# Patient Record
Sex: FEMALE | Race: WHITE | NOT HISPANIC OR LATINO | Employment: OTHER | URBAN - METROPOLITAN AREA
[De-identification: names, ages, dates, MRNs, and addresses within clinical notes are randomized per-mention and may not be internally consistent; named-entity substitution may affect disease eponyms.]

---

## 2017-01-29 DIAGNOSIS — J44.9 CHRONIC OBSTRUCTIVE PULMONARY DISEASE, UNSPECIFIED COPD TYPE (HCC): ICD-10-CM

## 2017-01-30 RX ORDER — ALBUTEROL SULFATE 90 UG/1
POWDER, METERED RESPIRATORY (INHALATION)
Qty: 1 EACH | Refills: 5 | Status: SHIPPED | OUTPATIENT
Start: 2017-01-30

## 2017-01-30 NOTE — TELEPHONE ENCOUNTER
Have we ever prescribed this med? Yes.  If yes, what date?     Last OV: 09/28/2016 - Denise Alfaro     Next OV: 02/14/2017 - Denise Alfaro     DX: COPD    Medications: Proair

## 2017-02-14 ENCOUNTER — OFFICE VISIT (OUTPATIENT)
Dept: PULMONOLOGY | Facility: HOSPICE | Age: 72
End: 2017-02-14
Payer: MEDICARE

## 2017-02-14 VITALS
DIASTOLIC BLOOD PRESSURE: 70 MMHG | WEIGHT: 223 LBS | HEIGHT: 62 IN | RESPIRATION RATE: 16 BRPM | TEMPERATURE: 98.6 F | OXYGEN SATURATION: 92 % | BODY MASS INDEX: 41.04 KG/M2 | HEART RATE: 91 BPM | SYSTOLIC BLOOD PRESSURE: 140 MMHG

## 2017-02-14 DIAGNOSIS — R91.8 RADIOLOGIC FINDINGS OF LUNG FIELD, ABNORMAL: ICD-10-CM

## 2017-02-14 DIAGNOSIS — G47.33 OSA ON CPAP: ICD-10-CM

## 2017-02-14 DIAGNOSIS — J43.9 PULMONARY EMPHYSEMA, UNSPECIFIED EMPHYSEMA TYPE (HCC): ICD-10-CM

## 2017-02-14 PROCEDURE — 3017F COLORECTAL CA SCREEN DOC REV: CPT | Mod: 8P | Performed by: NURSE PRACTITIONER

## 2017-02-14 PROCEDURE — G8484 FLU IMMUNIZE NO ADMIN: HCPCS | Performed by: NURSE PRACTITIONER

## 2017-02-14 PROCEDURE — 1036F TOBACCO NON-USER: CPT | Performed by: NURSE PRACTITIONER

## 2017-02-14 PROCEDURE — 99213 OFFICE O/P EST LOW 20 MIN: CPT | Performed by: NURSE PRACTITIONER

## 2017-02-14 PROCEDURE — 1101F PT FALLS ASSESS-DOCD LE1/YR: CPT | Mod: 8P | Performed by: NURSE PRACTITIONER

## 2017-02-14 PROCEDURE — 3014F SCREEN MAMMO DOC REV: CPT | Mod: 8P | Performed by: NURSE PRACTITIONER

## 2017-02-14 PROCEDURE — 4040F PNEUMOC VAC/ADMIN/RCVD: CPT | Mod: 8P | Performed by: NURSE PRACTITIONER

## 2017-02-14 PROCEDURE — G8432 DEP SCR NOT DOC, RNG: HCPCS | Performed by: NURSE PRACTITIONER

## 2017-02-14 PROCEDURE — G8419 CALC BMI OUT NRM PARAM NOF/U: HCPCS | Performed by: NURSE PRACTITIONER

## 2017-02-14 RX ORDER — ALBUTEROL SULFATE 90 UG/1
2 AEROSOL, METERED RESPIRATORY (INHALATION) EVERY 4 HOURS PRN
Qty: 1 INHALER | Refills: 5 | Status: SHIPPED | OUTPATIENT
Start: 2017-02-14

## 2017-02-14 NOTE — MR AVS SNAPSHOT
"        Chacha Aviles   2017 4:00 PM   Office Visit   MRN: [id number]    Department:  [facility]   Dept Phone:  [phone]    Description:  Female : 1945   Provider:  TORIBIO Rodriguez           Reason for Visit     COPD 4 Mth Follow Up      Allergies as of 2017     Allergen Noted Reactions    Latex 2009         You were diagnosed with     Pulmonary emphysema, unspecified emphysema type (CMS-HCC)   [2149182]       JESSIKA on CPAP   [648932]       Radiologic findings of lung field, abnormal   [861921]         Vital Signs     Blood Pressure Pulse Temperature Respirations Height Weight    140/70 mmHg 91 37 °C (98.6 °F) 16 1.575 m (5' 2.01\") 101.152 kg (223 lb)    Body Mass Index Oxygen Saturation Smoking Status             40.78 kg/m2 92% Former Smoker         Basic Information     Date Of Birth Sex Race Ethnicity Preferred Language    1945 Female White Non- English      Problem List              ICD-10-CM Priority Class Noted - Resolved    COPD (chronic obstructive pulmonary disease) (CMS-HCC) J44.9   2016 - Present    Radiologic findings of lung field, abnormal R91.8   2016 - Present    JESSIKA on CPAP G47.33   2016 - Present    NSTEMI (non-ST elevated myocardial infarction) (CMS-HCC) I21.4   2016 - Present    Ex-smoker Z87.891   10/31/2016 - Present    Dyslipidemia E78.5   10/31/2016 - Present      Health Maintenance        Date Due Completion Dates    IMM DTaP/Tdap/Td Vaccine (1 - Tdap) 1963 ---    PAP SMEAR 1965 ---    MAMMOGRAM 1984 ---    COLONOSCOPY 1994 ---    IMM ZOSTER VACCINE 2004 ---    BONE DENSITY 2009 ---    IMM PNEUMOCOCCAL 65+ (ADULT) LOW/MEDIUM RISK SERIES (1 of 2 - PCV13) 2009 ---    IMM INFLUENZA (1) 2016 ---            Current Immunizations     No immunizations on file.      Below and/or attached are the medications your provider expects you to take. Review all of your home medications and newly ordered " medications with your provider and/or pharmacist. Follow medication instructions as directed by your provider and/or pharmacist. Please keep your medication list with you and share with your provider. Update the information when medications are discontinued, doses are changed, or new medications (including over-the-counter products) are added; and carry medication information at all times in the event of emergency situations     Allergies:  LATEX - (reactions not documented)               Medications  Valid as of: February 14, 2017 -  4:28 PM    Generic Name Brand Name Tablet Size Instructions for use    Albuterol Sulfate (Aero Soln) albuterol 108 (90 BASE) MCG/ACT Inhale 2 Puffs by mouth every 6 hours as needed for Shortness of Breath.        Albuterol Sulfate (Nebu Soln) PROVENTIL 2.5mg/3ml 2.5 mg by Nebulization route every four hours as needed for Shortness of Breath.        Albuterol Sulfate (AEROSOL POWDER, BREATH ACTIVATED) PROAIR RESPICLICK 108 (90 BASE) MCG/ACT INHALE 2 PUFFS EVERY 4 TO 6 HOURS AS NEEDED        Albuterol Sulfate (Aero Soln) albuterol 108 (90 BASE) MCG/ACT Inhale 2 Puffs by mouth every four hours as needed for Shortness of Breath (Wheezing).        Aspirin (Tab) aspirin 81 MG Take 81 mg by mouth every day.        Atorvastatin Calcium (Tab) LIPITOR 40 MG Take 40 mg by mouth every evening.        Budesonide-Formoterol Fumarate (Aerosol) SYMBICORT 160-4.5 MCG/ACT Inhale 2 Puffs by mouth 2 Times a Day.        Carvedilol (Tab) COREG 3.125 MG Take 3.125 mg by mouth 2 times a day, with meals.        Citalopram Hydrobromide (Tab) CELEXA 40 MG Take 40 mg by mouth every day.        Doxycycline Hyclate (Cap) VIBRAMYCIN 100 MG Take 1 Cap by mouth 2 times a day. Take until gone.        HydroCHLOROthiazide (Cap) MICROZIDE 12.5 MG Take 12.5 mg by mouth every day.        Hydrocodone-Acetaminophen   Take  by mouth.        Hydrocodone-Acetaminophen (Tab) VICODIN 5-500 MG Take 1-2 Tabs by mouth every four  hours as needed.        Ibuprofen (Tab) MOTRIN 800 MG Take 1 Tab by mouth every 8 hours as needed for Mild Pain.        Lisinopril (Tab) PRINIVIL 2.5 MG Take 2.5 mg by mouth every day.        Loratadine (Tab) CLARITIN 10 MG Take 10 mg by mouth every day.        Mometasone Furo-Formoterol Fum (Aerosol) Mometasone Furo-Formoterol Fum 200-5 MCG/ACT Inhale 2 Puffs by mouth 2 Times a Day. Use spacer. Rinse mouth after use.        Naproxen   Take  by mouth.        PredniSONE (Tab) DELTASONE 10 MG Take 30mg x 5 days, then take 20mg x 5 days, then take 10mg x 5 days, with food, then discontinue.        RaNITidine HCl (Tab) ZANTAC 150 MG Take 150 mg by mouth 2 times a day.        Ticagrelor (Tab) BRILINTA 90 MG Take 90 mg by mouth 2 Times a Day.        Tiotropium Bromide Monohydrate (Cap) SPIRIVA 18 MCG Inhale 1 Cap by mouth every day.        Triamcinolone Acetonide (Cream) KENALOG 0.1 % Apply  to affected area(s) 2 times a day.        Zolpidem Tartrate (Tab) AMBIEN 5 MG Take 1-2 tablets by mouth every evening as needed for insomnia. Bring to sleep study.        .                 Medicines prescribed today were sent to:     [id number] - [place] - [address] [place] [address]    Phone: [phone] Fax: [phone]    Open 24 Hours?: No      Medication refill instructions:       If your prescription bottle indicates you have medication refills left, it is not necessary to call your provider’s office. Please contact your pharmacy and they will refill your medication.    If your prescription bottle indicates you do not have any refills left, you may request refills at any time through one of the following ways: The online [employer] system (except Urgent Care), by calling your provider’s office, or by asking your pharmacy to contact your provider’s office with a refill request. Medication refills are processed only during regular business hours and may not be available until the next business day. Your  provider may request additional information or to have a follow-up visit with you prior to refilling your medication.   *Please Note: Medication refills are assigned a new Rx number when refilled electronically. Your pharmacy may indicate that no refills were authorized even though a new prescription for the same medication is available at the pharmacy. Please request the medicine by name with the pharmacy before contacting your provider for a refill.        Instructions    1. Continue Symbicort 160/4.5 µg 2 inhalations twice daily, Spiriva daily, and albuterol nebulized treatment as needed.  2. Change air to Ventolin and obtain prior authorization as pro-air as not as effective as Ventolin.  3. Patient requires a CT scan November 2017 to note stability of her multiple 3 mm nodules. She will be moving to [place] and needs to obtain a CD with her CT scans.               [employer] Access Code: [email or web address]  Expires: 3/16/2017  4:28 PM    [employer]  A secure, online tool to manage your health information     [employer]’s [employer]® is a secure, online tool that connects you to your personalized health information from the privacy of your home -- day or night - making it very easy for you to manage your healthcare. Once the activation process is completed, you can even access your medical information using the [employer] jean-paul, which is available for free in the Apple [employer]-Paul store or Google Play store.     [employer] provides the following levels of access (as shown below):   My Chart Features   [facility] Primary Care Doctor [facility]  Specialists [facility]  Urgent  Care [facility]  Primary Care  Doctor   Email your healthcare team securely and privately 24/7 X X X    Manage appointments: schedule your next appointment; view details of past/upcoming appointments X      Request prescription refills. X      View recent personal medical records, including lab and immunizations X X X X   View health record, including health history,  allergies, medications X X X X   Read reports about your outpatient visits, procedures, consult and ER notes X X X X   See your discharge summary, which is a recap of your hospital and/or ER visit that includes your diagnosis, lab results, and care plan. X X       How to register for [employer]:  1. Go to  [email or web address].  2. Click on the Sign Up Now box, which takes you to the New Member Sign Up page. You will need to provide the following information:  a. Enter your [employer] Access Code exactly as it appears at the top of this page. (You will not need to use this code after you’ve completed the sign-up process. If you do not sign up before the expiration date, you must request a new code.)   b. Enter your date of birth.   c. Enter your home email address.   d. Click Submit, and follow the next screen’s instructions.  3. Create a [employer] ID. This will be your [employer] login ID and cannot be changed, so think of one that is secure and easy to remember.  4. Create a [employer] password. You can change your password at any time.  5. Enter your Password Reset Question and Answer. This can be used at a later time if you forget your password.   6. Enter your e-mail address. This allows you to receive e-mail notifications when new information is available in [employer].  7. Click Sign Up. You can now view your health information.    For assistance activating your [employer] account, call [phone]

## 2017-02-15 NOTE — PATIENT INSTRUCTIONS
1. Continue Symbicort 160/4.5 µg 2 inhalations twice daily, Spiriva daily, and albuterol nebulized treatment as needed.  2. Change air to Ventolin and obtain prior authorization as pro-air as not as effective as Ventolin.  3. Patient requires a CT scan November 2017 to note stability of her multiple 3 mm nodules. She will be moving to [place] and needs to obtain a CD with her CT scans.

## 2017-02-15 NOTE — PROGRESS NOTES
Chief Complaint   Patient presents with   • COPD     4 Mth Follow Up         HPI:  This is a 71 y.o. female with a history of chronic obstructive pulmonary disease.  Pulmonary function tests from 2014 indicated FEV1 0.85 L, 39% predicted with positive bronchodilator response, FEV1/FVC 72%, and DLCO 82% predicted. The patient is compliant with Symbicort 160/4.5 µg 2 inhalations twice daily, Spiriva daily, and pro-air Respiclick as needed. She has previously tried Ventolin and feels it is much more beneficial than pro-air. She would like to change back to Ventolin also so she can use her spacer. She reports being more short of breath since she was diagnosed with myocardial infarction. I've asked her to check with her cardiologist to determine if there are any medication such as carvedilol that could be worsening her asthma/COPD.    Polysomnogram indicates AHI 24.1 and minimum saturation 65%. The patient is compliant with CPAP 9 cm and 3 L of oxygen bleed in. She reports using her machine nightly. She is well rested.    Patient has a history of abnormal CT scan. CT from July 2016 indicates hyperexpanded emphysematous lungs with atelectasis bilaterally and 3.5 mm nodule in the right lower lobe and 3.2 mm nodule in the right lower lobe which were new. Follow-up CT scan dated 11/1/16 indicates stability of the 3.2 weight middle lobe nodule, 3 mm right lower lobe nodule, and 3 mm right lower lobe nodule. Would recommend one last follow-up CT in November 2017 as she is a former smoker.    Past Medical History   Diagnosis Date   • ASTHMA    • COPD (chronic obstructive pulmonary disease) (CMS-[facility])    • Radiologic findings of lung field, abnormal 6/14/2016   • JESSIKA on CPAP 7/28/2016     AHI 24.1, minimum saturation 65%, on CPAP 9 cm with 3 L of oxygen bleed in       Past Surgical History   Procedure Laterality Date   • Appendectomy     • Gyn surgery       hysterectomy       Social History   Substance Use Topics   • Smoking  "status: Former Smoker     Types: Cigarettes     Quit date: 02/01/2015   • Smokeless tobacco: Never Used      Comment: Vaper cigg   • Alcohol Use: Yes       ROS:   Constitutional: Denies fevers, chills, sweats, fatigue, and weight loss.  Eyes: Denies glasses.  Ears/nose/mouth/throat: Denies injury.  Cardiovascular: Denies chest pain, tightness.  Respiratory: See history of present illness.  GI: Denies heartburn, difficulty swallowing, nausea, and vomiting.  Neurological: Denies frequent headaches, dizziness, weakness.    Vitals:  Filed Vitals:    02/14/17 1551   Height: 1.575 m (5' 2.01\")   Weight: 101.152 kg (223 lb)   Weight % change since last entry.: 0 %   BP: 140/70   Pulse: 91   BMI (Calculated): 40.78   Resp: 16   Temp: 37 °C (98.6 °F)   O2 sat % on O2: 92 %   O2 Flow Rate (L/min): 3       Allergies:  Latex    Medications:  Current Outpatient Prescriptions   Medication Sig Dispense Refill   • albuterol (VENTOLIN HFA) 108 (90 BASE) MCG/ACT Aero Soln inhalation aerosol Inhale 2 Puffs by mouth every four hours as needed for Shortness of Breath (Wheezing). 1 Inhaler 5   • PROAIR RESPICLICK 108 (90 BASE) MCG/ACT AEROSOL POWDER, BREATH ACTIVATED INHALE 2 PUFFS EVERY 4 TO 6 HOURS AS NEEDED 1 Each 5   • budesonide-formoterol (SYMBICORT) 160-4.5 MCG/ACT Aerosol Inhale 2 Puffs by mouth 2 Times a Day. 3 Inhaler 3   • tiotropium (SPIRIVA) 18 MCG Cap Inhale 1 Cap by mouth every day. 30 Cap 5   • aspirin 81 MG tablet Take 81 mg by mouth every day.     • atorvastatin (LIPITOR) 40 MG Tab Take 40 mg by mouth every evening.     • carvedilol (COREG) 3.125 MG Tab Take 3.125 mg by mouth 2 times a day, with meals.     • lisinopril (PRINIVIL) 2.5 MG Tab Take 2.5 mg by mouth every day.     • ranitidine (ZANTAC) 150 MG Tab Take 150 mg by mouth 2 times a day.     • ticagrelor (BRILINTA) 90 MG Tab tablet Take 90 mg by mouth 2 Times a Day.     • citalopram (CELEXA) 40 MG Tab Take 40 mg by mouth every day.     • hydrochlorothiazide " (MICROZIDE) 12.5 MG capsule Take 12.5 mg by mouth every day.     • loratadine (CLARITIN) 10 MG Tab Take 10 mg by mouth every day.     • triamcinolone acetonide (KENALOG) 0.1 % Cream Apply  to affected area(s) 2 times a day.     • doxycycline (VIBRAMYCIN) 100 MG Cap Take 1 Cap by mouth 2 times a day. Take until gone. (Patient not taking: Reported on 10/31/2016) 20 Cap 1   • predniSONE (DELTASONE) 10 MG Tab Take 30mg x 5 days, then take 20mg x 5 days, then take 10mg x 5 days, with food, then discontinue. (Patient not taking: Reported on 10/31/2016) 30 Tab 1   • albuterol 108 (90 BASE) MCG/ACT Aero Soln inhalation aerosol Inhale 2 Puffs by mouth every 6 hours as needed for Shortness of Breath.     • albuterol (PROVENTIL) 2.5mg/3ml Nebu Soln solution for nebulization 2.5 mg by Nebulization route every four hours as needed for Shortness of Breath.     • Mometasone Furo-Formoterol Fum (DULERA) 200-5 MCG/ACT Aerosol Inhale 2 Puffs by mouth 2 Times a Day. Use spacer. Rinse mouth after use. (Patient not taking: Reported on 10/31/2016) 1 Inhaler 5   • zolpidem (AMBIEN) 5 MG Tab Take 1-2 tablets by mouth every evening as needed for insomnia. Bring to sleep study. (Patient not taking: Reported on 10/31/2016) 3 Tab 0   • NAPROXEN PO Take  by mouth.     • VICODIN PO Take  by mouth.     • hydrocodone-acetaminophen (VICODIN) 5-500 MG TABS Take 1-2 Tabs by mouth every four hours as needed. (Patient not taking: Reported on 10/31/2016) 15 Each 0   • ibuprofen (MOTRIN) 800 MG TABS Take 1 Tab by mouth every 8 hours as needed for Mild Pain. 20 Each 3     No current facility-administered medications for this visit.       PHYSICAL EXAM:  Appearance: Well-developed, well-nourished, no acute distress.  Eyes. PERRL.  Hearing: Grossly intact.  Oropharynx: Tongue normal, posterior pharynx without erythema or exudate.  Respiratory effort: No intercostal retractions or use of accessory muscles.  Lung auscultation: No crackles, wheezing.  Heart  auscultation: No murmur, gallop, or rub. Regular rate and rhythm.  Extremities: No cyanosis or edema.  Gait and Station: Normal  Orientation: Oriented to time, place, and person.    Assessment:  1. Pulmonary emphysema, unspecified emphysema type (CMS-HCC)  albuterol (VENTOLIN HFA) 108 (90 BASE) MCG/ACT Aero Soln inhalation aerosol   2. JESSIKA on CPAP  DME MASK AND SUPPLIES   3. Radiologic findings of lung field, abnormal           Plan:  1. Continue Symbicort 160/4.5 µg 2 inhalations twice daily, Spiriva daily, and albuterol nebulized treatment as needed.  2. Change air to Ventolin and obtain prior authorization as pro-air as not as effective as Ventolin.  3. Patient requires a CT scan November 2017 to note stability of her multiple 3 mm nodules. She will be moving to [place] and needs to obtain a CD with her CT scans.     Return if symptoms worsen or fail to improve, for With ONEL Lopez.

## 2017-03-14 ENCOUNTER — APPOINTMENT (OUTPATIENT)
Dept: PULMONOLOGY | Facility: HOSPICE | Age: 72
End: 2017-03-14
Payer: MEDICARE

## 2021-01-14 DIAGNOSIS — Z23 NEED FOR VACCINATION: ICD-10-CM

## 2022-10-25 ENCOUNTER — TELEPHONE (OUTPATIENT)
Dept: MEDICAL GROUP | Facility: CLINIC | Age: 77
End: 2022-10-25
Payer: MEDICARE

## 2022-10-25 NOTE — TELEPHONE ENCOUNTER
Caller Name:   Call Back Number: [phone]    How would the patient prefer to be contacted with a response: Phone call OK to leave a detailed message     called, he informed me that Chacha last saw Dr. Beard about 6 years ago. They moved to [place] Chacha did pass away last year.  The reason for today's call was to find out what blood pressure medication Chacha was taking. I let him know that Carvedilol, Lisinopril and HCTZ  were on her medication list.     I'll also pass this message down to Dr. Beard.